# Patient Record
Sex: MALE | Race: OTHER | Employment: OTHER | URBAN - METROPOLITAN AREA
[De-identification: names, ages, dates, MRNs, and addresses within clinical notes are randomized per-mention and may not be internally consistent; named-entity substitution may affect disease eponyms.]

---

## 2022-01-01 ENCOUNTER — APPOINTMENT (OUTPATIENT)
Dept: GENERAL RADIOLOGY | Facility: HOSPITAL | Age: 87
DRG: 177 | End: 2022-01-01
Attending: EMERGENCY MEDICINE
Payer: MEDICARE

## 2022-01-01 ENCOUNTER — HOSPITAL ENCOUNTER (INPATIENT)
Facility: HOSPITAL | Age: 87
LOS: 14 days | Discharge: HOSPICE | DRG: 177 | End: 2022-01-01
Attending: EMERGENCY MEDICINE | Admitting: INTERNAL MEDICINE
Payer: MEDICARE

## 2022-01-01 ENCOUNTER — APPOINTMENT (OUTPATIENT)
Dept: CT IMAGING | Facility: HOSPITAL | Age: 87
DRG: 177 | End: 2022-01-01
Attending: INTERNAL MEDICINE
Payer: MEDICARE

## 2022-01-01 ENCOUNTER — HOSPITAL ENCOUNTER (INPATIENT)
Facility: HOSPITAL | Age: 87
LOS: 2 days | DRG: 177 | End: 2022-01-01
Attending: INTERNAL MEDICINE | Admitting: INTERNAL MEDICINE
Payer: OTHER MISCELLANEOUS

## 2022-01-01 VITALS
TEMPERATURE: 98 F | WEIGHT: 156.25 LBS | OXYGEN SATURATION: 100 % | HEIGHT: 69 IN | SYSTOLIC BLOOD PRESSURE: 131 MMHG | HEART RATE: 72 BPM | DIASTOLIC BLOOD PRESSURE: 62 MMHG | BODY MASS INDEX: 23.14 KG/M2 | RESPIRATION RATE: 16 BRPM

## 2022-01-01 VITALS — TEMPERATURE: 100 F

## 2022-01-01 DIAGNOSIS — J18.9 PNEUMONIA OF RIGHT MIDDLE LOBE DUE TO INFECTIOUS ORGANISM: Primary | ICD-10-CM

## 2022-01-01 LAB
ADENOVIRUS PCR:: NOT DETECTED
ANION GAP SERPL CALC-SCNC: 3 MMOL/L (ref 0–18)
ANION GAP SERPL CALC-SCNC: 4 MMOL/L (ref 0–18)
ANION GAP SERPL CALC-SCNC: 5 MMOL/L (ref 0–18)
ANION GAP SERPL CALC-SCNC: 6 MMOL/L (ref 0–18)
ANION GAP SERPL CALC-SCNC: 7 MMOL/L (ref 0–18)
ANION GAP SERPL CALC-SCNC: 8 MMOL/L (ref 0–18)
ANION GAP SERPL CALC-SCNC: 9 MMOL/L (ref 0–18)
B PARAPERT DNA SPEC QL NAA+PROBE: NOT DETECTED
B PERT DNA SPEC QL NAA+PROBE: NOT DETECTED
BASE EXCESS BLD CALC-SCNC: 9.1 MMOL/L (ref ?–2)
BASOPHILS # BLD AUTO: 0.02 X10(3) UL (ref 0–0.2)
BASOPHILS # BLD AUTO: 0.04 X10(3) UL (ref 0–0.2)
BASOPHILS # BLD AUTO: 0.05 X10(3) UL (ref 0–0.2)
BASOPHILS # BLD AUTO: 0.06 X10(3) UL (ref 0–0.2)
BASOPHILS # BLD AUTO: 0.07 X10(3) UL (ref 0–0.2)
BASOPHILS # BLD AUTO: 0.08 X10(3) UL (ref 0–0.2)
BASOPHILS NFR BLD AUTO: 0.2 %
BASOPHILS NFR BLD AUTO: 0.4 %
BASOPHILS NFR BLD AUTO: 0.4 %
BASOPHILS NFR BLD AUTO: 0.5 %
BASOPHILS NFR BLD AUTO: 0.6 %
BASOPHILS NFR BLD AUTO: 0.7 %
BILIRUB UR QL: NEGATIVE
BUN BLD-MCNC: 12 MG/DL (ref 7–18)
BUN BLD-MCNC: 13 MG/DL (ref 7–18)
BUN BLD-MCNC: 15 MG/DL (ref 7–18)
BUN BLD-MCNC: 20 MG/DL (ref 7–18)
BUN BLD-MCNC: 41 MG/DL (ref 7–18)
BUN BLD-MCNC: 7 MG/DL (ref 7–18)
BUN BLD-MCNC: 7 MG/DL (ref 7–18)
BUN BLD-MCNC: 9 MG/DL (ref 7–18)
BUN/CREAT SERPL: 10.6 (ref 10–20)
BUN/CREAT SERPL: 11.1 (ref 10–20)
BUN/CREAT SERPL: 12.2 (ref 10–20)
BUN/CREAT SERPL: 14.8 (ref 10–20)
BUN/CREAT SERPL: 15.1 (ref 10–20)
BUN/CREAT SERPL: 16.5 (ref 10–20)
BUN/CREAT SERPL: 17.6 (ref 10–20)
BUN/CREAT SERPL: 19.5 (ref 10–20)
BUN/CREAT SERPL: 22.5 (ref 10–20)
BUN/CREAT SERPL: 49.4 (ref 10–20)
C PNEUM DNA SPEC QL NAA+PROBE: NOT DETECTED
CALCIUM BLD-MCNC: 8.7 MG/DL (ref 8.5–10.1)
CALCIUM BLD-MCNC: 8.8 MG/DL (ref 8.5–10.1)
CALCIUM BLD-MCNC: 8.8 MG/DL (ref 8.5–10.1)
CALCIUM BLD-MCNC: 8.9 MG/DL (ref 8.5–10.1)
CALCIUM BLD-MCNC: 9 MG/DL (ref 8.5–10.1)
CALCIUM BLD-MCNC: 9 MG/DL (ref 8.5–10.1)
CALCIUM BLD-MCNC: 9.2 MG/DL (ref 8.5–10.1)
CALCIUM BLD-MCNC: 9.3 MG/DL (ref 8.5–10.1)
CALCIUM BLD-MCNC: 9.3 MG/DL (ref 8.5–10.1)
CALCIUM BLD-MCNC: 9.7 MG/DL (ref 8.5–10.1)
CHLORIDE SERPL-SCNC: 101 MMOL/L (ref 98–112)
CHLORIDE SERPL-SCNC: 103 MMOL/L (ref 98–112)
CHLORIDE SERPL-SCNC: 104 MMOL/L (ref 98–112)
CHLORIDE SERPL-SCNC: 104 MMOL/L (ref 98–112)
CHLORIDE SERPL-SCNC: 105 MMOL/L (ref 98–112)
CHLORIDE SERPL-SCNC: 105 MMOL/L (ref 98–112)
CHLORIDE SERPL-SCNC: 106 MMOL/L (ref 98–112)
CHLORIDE SERPL-SCNC: 106 MMOL/L (ref 98–112)
CHLORIDE SERPL-SCNC: 107 MMOL/L (ref 98–112)
CHLORIDE SERPL-SCNC: 108 MMOL/L (ref 98–112)
CO2 SERPL-SCNC: 24 MMOL/L (ref 21–32)
CO2 SERPL-SCNC: 26 MMOL/L (ref 21–32)
CO2 SERPL-SCNC: 28 MMOL/L (ref 21–32)
CO2 SERPL-SCNC: 29 MMOL/L (ref 21–32)
CO2 SERPL-SCNC: 29 MMOL/L (ref 21–32)
CO2 SERPL-SCNC: 30 MMOL/L (ref 21–32)
CO2 SERPL-SCNC: 31 MMOL/L (ref 21–32)
CO2 SERPL-SCNC: 32 MMOL/L (ref 21–32)
COLOR UR: YELLOW
CORONAVIRUS 229E PCR:: NOT DETECTED
CORONAVIRUS HKU1 PCR:: NOT DETECTED
CORONAVIRUS NL63 PCR:: NOT DETECTED
CORONAVIRUS OC43 PCR:: NOT DETECTED
CREAT BLD-MCNC: 0.63 MG/DL
CREAT BLD-MCNC: 0.66 MG/DL
CREAT BLD-MCNC: 0.74 MG/DL
CREAT BLD-MCNC: 0.74 MG/DL
CREAT BLD-MCNC: 0.77 MG/DL
CREAT BLD-MCNC: 0.79 MG/DL
CREAT BLD-MCNC: 0.81 MG/DL
CREAT BLD-MCNC: 0.83 MG/DL
CREAT BLD-MCNC: 0.86 MG/DL
CREAT BLD-MCNC: 0.89 MG/DL
DEPRECATED RDW RBC AUTO: 43.1 FL (ref 35.1–46.3)
DEPRECATED RDW RBC AUTO: 43.3 FL (ref 35.1–46.3)
DEPRECATED RDW RBC AUTO: 43.4 FL (ref 35.1–46.3)
DEPRECATED RDW RBC AUTO: 43.8 FL (ref 35.1–46.3)
DEPRECATED RDW RBC AUTO: 44.4 FL (ref 35.1–46.3)
DEPRECATED RDW RBC AUTO: 45.6 FL (ref 35.1–46.3)
DEPRECATED RDW RBC AUTO: 45.6 FL (ref 35.1–46.3)
DEPRECATED RDW RBC AUTO: 45.7 FL (ref 35.1–46.3)
DEPRECATED RDW RBC AUTO: 46.2 FL (ref 35.1–46.3)
DEPRECATED RDW RBC AUTO: 46.5 FL (ref 35.1–46.3)
DEPRECATED RDW RBC AUTO: 46.8 FL (ref 35.1–46.3)
EOSINOPHIL # BLD AUTO: 0.01 X10(3) UL (ref 0–0.7)
EOSINOPHIL # BLD AUTO: 0.18 X10(3) UL (ref 0–0.7)
EOSINOPHIL # BLD AUTO: 0.42 X10(3) UL (ref 0–0.7)
EOSINOPHIL # BLD AUTO: 0.6 X10(3) UL (ref 0–0.7)
EOSINOPHIL # BLD AUTO: 0.6 X10(3) UL (ref 0–0.7)
EOSINOPHIL # BLD AUTO: 0.62 X10(3) UL (ref 0–0.7)
EOSINOPHIL # BLD AUTO: 0.7 X10(3) UL (ref 0–0.7)
EOSINOPHIL # BLD AUTO: 0.72 X10(3) UL (ref 0–0.7)
EOSINOPHIL # BLD AUTO: 0.73 X10(3) UL (ref 0–0.7)
EOSINOPHIL # BLD AUTO: 0.81 X10(3) UL (ref 0–0.7)
EOSINOPHIL # BLD AUTO: 0.86 X10(3) UL (ref 0–0.7)
EOSINOPHIL NFR BLD AUTO: 0.1 %
EOSINOPHIL NFR BLD AUTO: 1.5 %
EOSINOPHIL NFR BLD AUTO: 3.5 %
EOSINOPHIL NFR BLD AUTO: 6 %
EOSINOPHIL NFR BLD AUTO: 6 %
EOSINOPHIL NFR BLD AUTO: 6.2 %
EOSINOPHIL NFR BLD AUTO: 6.2 %
EOSINOPHIL NFR BLD AUTO: 6.3 %
EOSINOPHIL NFR BLD AUTO: 6.6 %
EOSINOPHIL NFR BLD AUTO: 6.7 %
EOSINOPHIL NFR BLD AUTO: 7.4 %
ERYTHROCYTE [DISTWIDTH] IN BLOOD BY AUTOMATED COUNT: 12.3 % (ref 11–15)
ERYTHROCYTE [DISTWIDTH] IN BLOOD BY AUTOMATED COUNT: 12.4 % (ref 11–15)
ERYTHROCYTE [DISTWIDTH] IN BLOOD BY AUTOMATED COUNT: 12.4 % (ref 11–15)
ERYTHROCYTE [DISTWIDTH] IN BLOOD BY AUTOMATED COUNT: 12.5 % (ref 11–15)
ERYTHROCYTE [DISTWIDTH] IN BLOOD BY AUTOMATED COUNT: 12.7 % (ref 11–15)
ERYTHROCYTE [DISTWIDTH] IN BLOOD BY AUTOMATED COUNT: 12.7 % (ref 11–15)
ERYTHROCYTE [DISTWIDTH] IN BLOOD BY AUTOMATED COUNT: 12.9 % (ref 11–15)
ERYTHROCYTE [DISTWIDTH] IN BLOOD BY AUTOMATED COUNT: 12.9 % (ref 11–15)
ERYTHROCYTE [DISTWIDTH] IN BLOOD BY AUTOMATED COUNT: 13 % (ref 11–15)
ERYTHROCYTE [DISTWIDTH] IN BLOOD BY AUTOMATED COUNT: 13.2 % (ref 11–15)
ERYTHROCYTE [DISTWIDTH] IN BLOOD BY AUTOMATED COUNT: 13.3 % (ref 11–15)
FLUAV RNA SPEC QL NAA+PROBE: NOT DETECTED
FLUBV RNA SPEC QL NAA+PROBE: NOT DETECTED
GLUCOSE BLD-MCNC: 100 MG/DL (ref 70–99)
GLUCOSE BLD-MCNC: 102 MG/DL (ref 70–99)
GLUCOSE BLD-MCNC: 103 MG/DL (ref 70–99)
GLUCOSE BLD-MCNC: 106 MG/DL (ref 70–99)
GLUCOSE BLD-MCNC: 111 MG/DL (ref 70–99)
GLUCOSE BLD-MCNC: 111 MG/DL (ref 70–99)
GLUCOSE BLD-MCNC: 113 MG/DL (ref 70–99)
GLUCOSE BLD-MCNC: 114 MG/DL (ref 70–99)
GLUCOSE BLD-MCNC: 85 MG/DL (ref 70–99)
GLUCOSE BLD-MCNC: 91 MG/DL (ref 70–99)
GLUCOSE BLDC GLUCOMTR-MCNC: 101 MG/DL (ref 70–99)
GLUCOSE UR-MCNC: NEGATIVE MG/DL
HCO3 BLDA-SCNC: 31.9 MEQ/L (ref 21–27)
HCT VFR BLD AUTO: 37.3 %
HCT VFR BLD AUTO: 38.9 %
HCT VFR BLD AUTO: 39.2 %
HCT VFR BLD AUTO: 39.4 %
HCT VFR BLD AUTO: 39.7 %
HCT VFR BLD AUTO: 40.6 %
HCT VFR BLD AUTO: 40.7 %
HCT VFR BLD AUTO: 40.9 %
HCT VFR BLD AUTO: 41.5 %
HCT VFR BLD AUTO: 42.3 %
HCT VFR BLD AUTO: 45.4 %
HGB BLD-MCNC: 12.6 G/DL
HGB BLD-MCNC: 12.8 G/DL
HGB BLD-MCNC: 12.9 G/DL
HGB BLD-MCNC: 13.1 G/DL
HGB BLD-MCNC: 13.4 G/DL
HGB BLD-MCNC: 13.7 G/DL
HGB BLD-MCNC: 13.8 G/DL
HGB BLD-MCNC: 13.8 G/DL
HGB BLD-MCNC: 14.6 G/DL
HGB UR QL STRIP.AUTO: NEGATIVE
IMM GRANULOCYTES # BLD AUTO: 0.05 X10(3) UL (ref 0–1)
IMM GRANULOCYTES # BLD AUTO: 0.05 X10(3) UL (ref 0–1)
IMM GRANULOCYTES # BLD AUTO: 0.06 X10(3) UL (ref 0–1)
IMM GRANULOCYTES # BLD AUTO: 0.06 X10(3) UL (ref 0–1)
IMM GRANULOCYTES # BLD AUTO: 0.07 X10(3) UL (ref 0–1)
IMM GRANULOCYTES # BLD AUTO: 0.08 X10(3) UL (ref 0–1)
IMM GRANULOCYTES # BLD AUTO: 0.09 X10(3) UL (ref 0–1)
IMM GRANULOCYTES # BLD AUTO: 0.1 X10(3) UL (ref 0–1)
IMM GRANULOCYTES NFR BLD: 0.5 %
IMM GRANULOCYTES NFR BLD: 0.5 %
IMM GRANULOCYTES NFR BLD: 0.6 %
IMM GRANULOCYTES NFR BLD: 0.7 %
IMM GRANULOCYTES NFR BLD: 0.8 %
KETONES UR-MCNC: 80 MG/DL
LEUKOCYTE ESTERASE UR QL STRIP.AUTO: NEGATIVE
LYMPHOCYTES # BLD AUTO: 0.65 X10(3) UL (ref 1–4)
LYMPHOCYTES # BLD AUTO: 1.01 X10(3) UL (ref 1–4)
LYMPHOCYTES # BLD AUTO: 1.54 X10(3) UL (ref 1–4)
LYMPHOCYTES # BLD AUTO: 1.55 X10(3) UL (ref 1–4)
LYMPHOCYTES # BLD AUTO: 1.57 X10(3) UL (ref 1–4)
LYMPHOCYTES # BLD AUTO: 1.61 X10(3) UL (ref 1–4)
LYMPHOCYTES # BLD AUTO: 1.65 X10(3) UL (ref 1–4)
LYMPHOCYTES # BLD AUTO: 1.65 X10(3) UL (ref 1–4)
LYMPHOCYTES # BLD AUTO: 1.7 X10(3) UL (ref 1–4)
LYMPHOCYTES # BLD AUTO: 1.9 X10(3) UL (ref 1–4)
LYMPHOCYTES # BLD AUTO: 1.95 X10(3) UL (ref 1–4)
LYMPHOCYTES NFR BLD AUTO: 13.2 %
LYMPHOCYTES NFR BLD AUTO: 13.8 %
LYMPHOCYTES NFR BLD AUTO: 14.2 %
LYMPHOCYTES NFR BLD AUTO: 14.7 %
LYMPHOCYTES NFR BLD AUTO: 14.9 %
LYMPHOCYTES NFR BLD AUTO: 15.9 %
LYMPHOCYTES NFR BLD AUTO: 16.5 %
LYMPHOCYTES NFR BLD AUTO: 16.5 %
LYMPHOCYTES NFR BLD AUTO: 17.9 %
LYMPHOCYTES NFR BLD AUTO: 5.6 %
LYMPHOCYTES NFR BLD AUTO: 8.2 %
MCH RBC QN AUTO: 31.3 PG (ref 26–34)
MCH RBC QN AUTO: 31.4 PG (ref 26–34)
MCH RBC QN AUTO: 31.5 PG (ref 26–34)
MCH RBC QN AUTO: 31.5 PG (ref 26–34)
MCH RBC QN AUTO: 31.8 PG (ref 26–34)
MCH RBC QN AUTO: 31.9 PG (ref 26–34)
MCH RBC QN AUTO: 32.1 PG (ref 26–34)
MCH RBC QN AUTO: 32.2 PG (ref 26–34)
MCH RBC QN AUTO: 32.2 PG (ref 26–34)
MCH RBC QN AUTO: 32.3 PG (ref 26–34)
MCH RBC QN AUTO: 32.4 PG (ref 26–34)
MCHC RBC AUTO-ENTMCNC: 32.2 G/DL (ref 31–37)
MCHC RBC AUTO-ENTMCNC: 32.3 G/DL (ref 31–37)
MCHC RBC AUTO-ENTMCNC: 32.6 G/DL (ref 31–37)
MCHC RBC AUTO-ENTMCNC: 32.7 G/DL (ref 31–37)
MCHC RBC AUTO-ENTMCNC: 32.9 G/DL (ref 31–37)
MCHC RBC AUTO-ENTMCNC: 32.9 G/DL (ref 31–37)
MCHC RBC AUTO-ENTMCNC: 33.4 G/DL (ref 31–37)
MCHC RBC AUTO-ENTMCNC: 33.7 G/DL (ref 31–37)
MCHC RBC AUTO-ENTMCNC: 33.7 G/DL (ref 31–37)
MCHC RBC AUTO-ENTMCNC: 33.8 G/DL (ref 31–37)
MCHC RBC AUTO-ENTMCNC: 33.8 G/DL (ref 31–37)
MCV RBC AUTO: 95.2 FL
MCV RBC AUTO: 95.3 FL
MCV RBC AUTO: 95.5 FL
MCV RBC AUTO: 95.6 FL
MCV RBC AUTO: 96.8 FL
MCV RBC AUTO: 97 FL
MCV RBC AUTO: 97.4 FL
MCV RBC AUTO: 97.6 FL
MCV RBC AUTO: 97.7 FL
METAPNEUMOVIRUS PCR:: NOT DETECTED
MODIFIED ALLEN TEST: POSITIVE
MONOCYTES # BLD AUTO: 0.68 X10(3) UL (ref 0.1–1)
MONOCYTES # BLD AUTO: 0.8 X10(3) UL (ref 0.1–1)
MONOCYTES # BLD AUTO: 0.92 X10(3) UL (ref 0.1–1)
MONOCYTES # BLD AUTO: 0.94 X10(3) UL (ref 0.1–1)
MONOCYTES # BLD AUTO: 0.94 X10(3) UL (ref 0.1–1)
MONOCYTES # BLD AUTO: 0.95 X10(3) UL (ref 0.1–1)
MONOCYTES # BLD AUTO: 1.17 X10(3) UL (ref 0.1–1)
MONOCYTES # BLD AUTO: 1.18 X10(3) UL (ref 0.1–1)
MONOCYTES # BLD AUTO: 1.21 X10(3) UL (ref 0.1–1)
MONOCYTES # BLD AUTO: 1.25 X10(3) UL (ref 0.1–1)
MONOCYTES # BLD AUTO: 1.46 X10(3) UL (ref 0.1–1)
MONOCYTES NFR BLD AUTO: 10.2 %
MONOCYTES NFR BLD AUTO: 10.6 %
MONOCYTES NFR BLD AUTO: 10.7 %
MONOCYTES NFR BLD AUTO: 12.2 %
MONOCYTES NFR BLD AUTO: 5.9 %
MONOCYTES NFR BLD AUTO: 7.4 %
MONOCYTES NFR BLD AUTO: 8 %
MONOCYTES NFR BLD AUTO: 8.9 %
MONOCYTES NFR BLD AUTO: 9.2 %
MONOCYTES NFR BLD AUTO: 9.4 %
MONOCYTES NFR BLD AUTO: 9.8 %
MRSA DNA SPEC QL NAA+PROBE: NEGATIVE
MYCOPLASMA PNEUMONIA PCR:: NOT DETECTED
NEUTROPHILS # BLD AUTO: 10.07 X10 (3) UL (ref 1.5–7.7)
NEUTROPHILS # BLD AUTO: 10.07 X10(3) UL (ref 1.5–7.7)
NEUTROPHILS # BLD AUTO: 10.11 X10 (3) UL (ref 1.5–7.7)
NEUTROPHILS # BLD AUTO: 10.11 X10(3) UL (ref 1.5–7.7)
NEUTROPHILS # BLD AUTO: 6.48 X10 (3) UL (ref 1.5–7.7)
NEUTROPHILS # BLD AUTO: 6.48 X10(3) UL (ref 1.5–7.7)
NEUTROPHILS # BLD AUTO: 6.68 X10 (3) UL (ref 1.5–7.7)
NEUTROPHILS # BLD AUTO: 6.68 X10(3) UL (ref 1.5–7.7)
NEUTROPHILS # BLD AUTO: 6.85 X10 (3) UL (ref 1.5–7.7)
NEUTROPHILS # BLD AUTO: 6.85 X10 (3) UL (ref 1.5–7.7)
NEUTROPHILS # BLD AUTO: 6.85 X10(3) UL (ref 1.5–7.7)
NEUTROPHILS # BLD AUTO: 6.85 X10(3) UL (ref 1.5–7.7)
NEUTROPHILS # BLD AUTO: 7.29 X10 (3) UL (ref 1.5–7.7)
NEUTROPHILS # BLD AUTO: 7.29 X10(3) UL (ref 1.5–7.7)
NEUTROPHILS # BLD AUTO: 7.74 X10 (3) UL (ref 1.5–7.7)
NEUTROPHILS # BLD AUTO: 7.74 X10(3) UL (ref 1.5–7.7)
NEUTROPHILS # BLD AUTO: 8.28 X10 (3) UL (ref 1.5–7.7)
NEUTROPHILS # BLD AUTO: 8.28 X10(3) UL (ref 1.5–7.7)
NEUTROPHILS # BLD AUTO: 8.48 X10 (3) UL (ref 1.5–7.7)
NEUTROPHILS # BLD AUTO: 8.48 X10(3) UL (ref 1.5–7.7)
NEUTROPHILS # BLD AUTO: 8.67 X10 (3) UL (ref 1.5–7.7)
NEUTROPHILS # BLD AUTO: 8.67 X10(3) UL (ref 1.5–7.7)
NEUTROPHILS NFR BLD AUTO: 62.8 %
NEUTROPHILS NFR BLD AUTO: 66.9 %
NEUTROPHILS NFR BLD AUTO: 67.1 %
NEUTROPHILS NFR BLD AUTO: 67.9 %
NEUTROPHILS NFR BLD AUTO: 68.2 %
NEUTROPHILS NFR BLD AUTO: 68.3 %
NEUTROPHILS NFR BLD AUTO: 68.8 %
NEUTROPHILS NFR BLD AUTO: 68.9 %
NEUTROPHILS NFR BLD AUTO: 69.2 %
NEUTROPHILS NFR BLD AUTO: 81.9 %
NEUTROPHILS NFR BLD AUTO: 87.5 %
NITRITE UR QL STRIP.AUTO: NEGATIVE
NT-PROBNP SERPL-MCNC: 205 PG/ML (ref ?–450)
O2 CT BLD-SCNC: 16.6 VOL% (ref 15–23)
OSMOLALITY SERPL CALC.SUM OF ELEC: 283 MOSM/KG (ref 275–295)
OSMOLALITY SERPL CALC.SUM OF ELEC: 286 MOSM/KG (ref 275–295)
OSMOLALITY SERPL CALC.SUM OF ELEC: 287 MOSM/KG (ref 275–295)
OSMOLALITY SERPL CALC.SUM OF ELEC: 287 MOSM/KG (ref 275–295)
OSMOLALITY SERPL CALC.SUM OF ELEC: 288 MOSM/KG (ref 275–295)
OSMOLALITY SERPL CALC.SUM OF ELEC: 289 MOSM/KG (ref 275–295)
OSMOLALITY SERPL CALC.SUM OF ELEC: 291 MOSM/KG (ref 275–295)
OSMOLALITY SERPL CALC.SUM OF ELEC: 292 MOSM/KG (ref 275–295)
OSMOLALITY SERPL CALC.SUM OF ELEC: 294 MOSM/KG (ref 275–295)
OSMOLALITY SERPL CALC.SUM OF ELEC: 305 MOSM/KG (ref 275–295)
PARAINFLUENZA 1 PCR:: NOT DETECTED
PARAINFLUENZA 2 PCR:: NOT DETECTED
PARAINFLUENZA 3 PCR:: NOT DETECTED
PARAINFLUENZA 4 PCR:: NOT DETECTED
PCO2 BLDA: 44 MM HG (ref 35–45)
PH BLDA: 7.49 [PH] (ref 7.35–7.45)
PH UR: 5 [PH] (ref 5–8)
PLATELET # BLD AUTO: 206 10(3)UL (ref 150–450)
PLATELET # BLD AUTO: 221 10(3)UL (ref 150–450)
PLATELET # BLD AUTO: 222 10(3)UL (ref 150–450)
PLATELET # BLD AUTO: 231 10(3)UL (ref 150–450)
PLATELET # BLD AUTO: 232 10(3)UL (ref 150–450)
PLATELET # BLD AUTO: 234 10(3)UL (ref 150–450)
PLATELET # BLD AUTO: 241 10(3)UL (ref 150–450)
PLATELET # BLD AUTO: 264 10(3)UL (ref 150–450)
PLATELET # BLD AUTO: 265 10(3)UL (ref 150–450)
PLATELET # BLD AUTO: 277 10(3)UL (ref 150–450)
PLATELET # BLD AUTO: 293 10(3)UL (ref 150–450)
PO2 BLDA: 61 MM HG (ref 80–100)
POTASSIUM SERPL-SCNC: 3.3 MMOL/L (ref 3.5–5.1)
POTASSIUM SERPL-SCNC: 3.4 MMOL/L (ref 3.5–5.1)
POTASSIUM SERPL-SCNC: 3.4 MMOL/L (ref 3.5–5.1)
POTASSIUM SERPL-SCNC: 3.5 MMOL/L (ref 3.5–5.1)
POTASSIUM SERPL-SCNC: 3.6 MMOL/L (ref 3.5–5.1)
POTASSIUM SERPL-SCNC: 3.7 MMOL/L (ref 3.5–5.1)
POTASSIUM SERPL-SCNC: 3.9 MMOL/L (ref 3.5–5.1)
POTASSIUM SERPL-SCNC: 3.9 MMOL/L (ref 3.5–5.1)
POTASSIUM SERPL-SCNC: 4.4 MMOL/L (ref 3.5–5.1)
PROCALCITONIN SERPL-MCNC: 0.03 NG/ML (ref ?–0.16)
PROT UR-MCNC: 100 MG/DL
PUNCTURE CHARGE: YES
RBC # BLD AUTO: 3.9 X10(6)UL
RBC # BLD AUTO: 4.02 X10(6)UL
RBC # BLD AUTO: 4.04 X10(6)UL
RBC # BLD AUTO: 4.12 X10(6)UL
RBC # BLD AUTO: 4.17 X10(6)UL
RBC # BLD AUTO: 4.26 X10(6)UL
RBC # BLD AUTO: 4.28 X10(6)UL
RBC # BLD AUTO: 4.33 X10(6)UL
RBC # BLD AUTO: 4.65 X10(6)UL
RHINOVIRUS/ENTERO PCR:: NOT DETECTED
RSV RNA SPEC QL NAA+PROBE: NOT DETECTED
SAO2 % BLDA: 96.7 % (ref 94–100)
SARS-COV-2 RNA NPH QL NAA+NON-PROBE: DETECTED
SARS-COV-2 RNA RESP QL NAA+PROBE: NOT DETECTED
SODIUM SERPL-SCNC: 137 MMOL/L (ref 136–145)
SODIUM SERPL-SCNC: 138 MMOL/L (ref 136–145)
SODIUM SERPL-SCNC: 139 MMOL/L (ref 136–145)
SODIUM SERPL-SCNC: 140 MMOL/L (ref 136–145)
SODIUM SERPL-SCNC: 140 MMOL/L (ref 136–145)
SODIUM SERPL-SCNC: 142 MMOL/L (ref 136–145)
SODIUM SERPL-SCNC: 142 MMOL/L (ref 136–145)
SP GR UR STRIP: >1.03 (ref 1–1.03)
UROBILINOGEN UR STRIP-ACNC: 4
WBC # BLD AUTO: 10 X10(3) UL (ref 4–11)
WBC # BLD AUTO: 10 X10(3) UL (ref 4–11)
WBC # BLD AUTO: 10.6 X10(3) UL (ref 4–11)
WBC # BLD AUTO: 10.9 X10(3) UL (ref 4–11)
WBC # BLD AUTO: 11.4 X10(3) UL (ref 4–11)
WBC # BLD AUTO: 11.5 X10(3) UL (ref 4–11)
WBC # BLD AUTO: 12 X10(3) UL (ref 4–11)
WBC # BLD AUTO: 12.2 X10(3) UL (ref 4–11)
WBC # BLD AUTO: 12.4 X10(3) UL (ref 4–11)
WBC # BLD AUTO: 12.8 X10(3) UL (ref 4–11)
WBC # BLD AUTO: 9.7 X10(3) UL (ref 4–11)

## 2022-01-01 PROCEDURE — 70450 CT HEAD/BRAIN W/O DYE: CPT | Performed by: INTERNAL MEDICINE

## 2022-01-01 PROCEDURE — 95816 EEG AWAKE AND DROWSY: CPT | Performed by: OTHER

## 2022-01-01 PROCEDURE — 71045 X-RAY EXAM CHEST 1 VIEW: CPT | Performed by: EMERGENCY MEDICINE

## 2022-01-01 PROCEDURE — 99221 1ST HOSP IP/OBS SF/LOW 40: CPT | Performed by: REGISTERED NURSE

## 2022-01-01 RX ORDER — DEXTROSE AND SODIUM CHLORIDE 5; .45 G/100ML; G/100ML
INJECTION, SOLUTION INTRAVENOUS CONTINUOUS
Status: CANCELLED | OUTPATIENT
Start: 2022-01-01

## 2022-01-01 RX ORDER — DONEPEZIL HYDROCHLORIDE 10 MG/1
10 TABLET, FILM COATED ORAL NIGHTLY
COMMUNITY

## 2022-01-01 RX ORDER — RISPERIDONE 1 MG/1
1 TABLET, FILM COATED ORAL 2 TIMES DAILY
COMMUNITY

## 2022-01-01 RX ORDER — BISACODYL 10 MG
10 SUPPOSITORY, RECTAL RECTAL
Status: DISCONTINUED | OUTPATIENT
Start: 2022-01-01 | End: 2022-01-01

## 2022-01-01 RX ORDER — LORAZEPAM 2 MG/ML
0.5 INJECTION INTRAMUSCULAR EVERY 4 HOURS PRN
Status: DISCONTINUED | OUTPATIENT
Start: 2022-01-01 | End: 2022-01-01

## 2022-01-01 RX ORDER — MORPHINE SULFATE IN 0.9 % NACL 1 MG/ML
1 PLASTIC BAG, INJECTION (ML) INTRAVENOUS CONTINUOUS PRN
Status: DISCONTINUED | OUTPATIENT
Start: 2022-01-01 | End: 2022-01-01

## 2022-01-01 RX ORDER — DEXTROSE AND SODIUM CHLORIDE 5; .45 G/100ML; G/100ML
INJECTION, SOLUTION INTRAVENOUS CONTINUOUS
Status: DISCONTINUED | OUTPATIENT
Start: 2022-01-01 | End: 2022-01-01

## 2022-01-01 RX ORDER — MULTIPLE VITAMINS W/ MINERALS TAB 9MG-400MCG
1 TAB ORAL DAILY
Status: DISCONTINUED | OUTPATIENT
Start: 2022-01-01 | End: 2022-01-01

## 2022-01-01 RX ORDER — CHOLECALCIFEROL (VITAMIN D3) 125 MCG
5 CAPSULE ORAL NIGHTLY
COMMUNITY

## 2022-01-01 RX ORDER — HEPARIN SODIUM 5000 [USP'U]/ML
5000 INJECTION, SOLUTION INTRAVENOUS; SUBCUTANEOUS EVERY 12 HOURS SCHEDULED
Status: DISCONTINUED | OUTPATIENT
Start: 2022-01-01 | End: 2022-01-01

## 2022-01-01 RX ORDER — RISPERIDONE 0.5 MG/1
0.5 TABLET, FILM COATED ORAL NIGHTLY
COMMUNITY

## 2022-01-01 RX ORDER — ALBUTEROL SULFATE 90 UG/1
2 AEROSOL, METERED RESPIRATORY (INHALATION) 4 TIMES DAILY
Status: DISCONTINUED | OUTPATIENT
Start: 2022-01-01 | End: 2022-01-01

## 2022-01-01 RX ORDER — ACETAMINOPHEN 650 MG/1
650 SUPPOSITORY RECTAL EVERY 4 HOURS PRN
Status: DISCONTINUED | OUTPATIENT
Start: 2022-01-01 | End: 2022-01-01

## 2022-01-01 RX ORDER — LORAZEPAM 2 MG/ML
2 INJECTION INTRAMUSCULAR EVERY 4 HOURS PRN
Status: DISCONTINUED | OUTPATIENT
Start: 2022-01-01 | End: 2022-01-01

## 2022-01-01 RX ORDER — LATANOPROST 50 UG/ML
1 SOLUTION/ DROPS OPHTHALMIC NIGHTLY
Status: CANCELLED | OUTPATIENT
Start: 2022-01-01

## 2022-01-01 RX ORDER — HALOPERIDOL 5 MG/ML
1 INJECTION INTRAMUSCULAR
Status: DISCONTINUED | OUTPATIENT
Start: 2022-01-01 | End: 2022-01-01

## 2022-01-01 RX ORDER — GUAIFENESIN AND DEXTROMETHORPHAN HYDROBROMIDE 100; 10 MG/5ML; MG/5ML
5 SOLUTION ORAL AS NEEDED
COMMUNITY

## 2022-01-01 RX ORDER — ONDANSETRON 2 MG/ML
4 INJECTION INTRAMUSCULAR; INTRAVENOUS EVERY 6 HOURS PRN
Status: DISCONTINUED | OUTPATIENT
Start: 2022-01-01 | End: 2022-01-01

## 2022-01-01 RX ORDER — FUROSEMIDE 10 MG/ML
40 INJECTION INTRAMUSCULAR; INTRAVENOUS EVERY 8 HOURS PRN
Status: DISCONTINUED | OUTPATIENT
Start: 2022-01-01 | End: 2022-01-01

## 2022-01-01 RX ORDER — ATROPINE SULFATE 10 MG/ML
2 SOLUTION/ DROPS OPHTHALMIC EVERY 2 HOUR PRN
Status: DISCONTINUED | OUTPATIENT
Start: 2022-01-01 | End: 2022-01-01

## 2022-01-01 RX ORDER — SCOLOPAMINE TRANSDERMAL SYSTEM 1 MG/1
1 PATCH, EXTENDED RELEASE TRANSDERMAL
Status: DISCONTINUED | OUTPATIENT
Start: 2022-01-01 | End: 2022-01-01

## 2022-01-01 RX ORDER — RISPERIDONE 0.5 MG/1
1 TABLET, FILM COATED ORAL
Status: DISCONTINUED | OUTPATIENT
Start: 2022-01-01 | End: 2022-01-01

## 2022-01-01 RX ORDER — GLYCOPYRROLATE 0.2 MG/ML
0.4 INJECTION, SOLUTION INTRAMUSCULAR; INTRAVENOUS
Status: DISCONTINUED | OUTPATIENT
Start: 2022-01-01 | End: 2022-01-01

## 2022-01-01 RX ORDER — HALOPERIDOL 5 MG/ML
2 INJECTION INTRAMUSCULAR
Status: DISCONTINUED | OUTPATIENT
Start: 2022-01-01 | End: 2022-01-01

## 2022-01-01 RX ORDER — CHOLECALCIFEROL (VITAMIN D3) 125 MCG
500 CAPSULE ORAL DAILY
Status: DISCONTINUED | OUTPATIENT
Start: 2022-01-01 | End: 2022-01-01

## 2022-01-01 RX ORDER — LORAZEPAM 2 MG/ML
1 INJECTION INTRAMUSCULAR EVERY 4 HOURS PRN
Status: DISCONTINUED | OUTPATIENT
Start: 2022-01-01 | End: 2022-01-01

## 2022-01-01 RX ORDER — MORPHINE SULFATE 2 MG/ML
1 INJECTION, SOLUTION INTRAMUSCULAR; INTRAVENOUS
Status: DISCONTINUED | OUTPATIENT
Start: 2022-01-01 | End: 2022-01-01

## 2022-01-01 RX ORDER — DONEPEZIL HYDROCHLORIDE 10 MG/1
10 TABLET, FILM COATED ORAL NIGHTLY
Status: DISCONTINUED | OUTPATIENT
Start: 2022-01-01 | End: 2022-01-01

## 2022-01-01 RX ORDER — IPRATROPIUM BROMIDE AND ALBUTEROL SULFATE 2.5; .5 MG/3ML; MG/3ML
3 SOLUTION RESPIRATORY (INHALATION) EVERY 6 HOURS PRN
Status: DISCONTINUED | OUTPATIENT
Start: 2022-01-01 | End: 2022-01-01

## 2022-01-01 RX ORDER — IPRATROPIUM BROMIDE AND ALBUTEROL SULFATE 2.5; .5 MG/3ML; MG/3ML
3 SOLUTION RESPIRATORY (INHALATION) ONCE
Status: COMPLETED | OUTPATIENT
Start: 2022-01-01 | End: 2022-01-01

## 2022-01-01 RX ORDER — LATANOPROST 50 UG/ML
1 SOLUTION/ DROPS OPHTHALMIC NIGHTLY
COMMUNITY

## 2022-01-01 RX ORDER — SODIUM CHLORIDE 0.9 % (FLUSH) 0.9 %
10 SYRINGE (ML) INJECTION AS NEEDED
Status: DISCONTINUED | OUTPATIENT
Start: 2022-01-01 | End: 2022-01-01

## 2022-01-01 RX ORDER — MIRTAZAPINE 30 MG/1
30 TABLET, FILM COATED ORAL NIGHTLY
COMMUNITY

## 2022-01-01 RX ORDER — RISPERIDONE 0.5 MG/1
0.5 TABLET, FILM COATED ORAL NIGHTLY
Status: DISCONTINUED | OUTPATIENT
Start: 2022-01-01 | End: 2022-01-01

## 2022-01-01 RX ORDER — LATANOPROST 50 UG/ML
1 SOLUTION/ DROPS OPHTHALMIC NIGHTLY
Status: DISCONTINUED | OUTPATIENT
Start: 2022-01-01 | End: 2022-01-01

## 2022-01-01 RX ORDER — ALBUTEROL SULFATE 90 UG/1
4 AEROSOL, METERED RESPIRATORY (INHALATION) EVERY 4 HOURS PRN
Status: DISCONTINUED | OUTPATIENT
Start: 2022-01-01 | End: 2022-01-01

## 2022-01-01 RX ORDER — MIRTAZAPINE 30 MG/1
30 TABLET, FILM COATED ORAL NIGHTLY
Status: DISCONTINUED | OUTPATIENT
Start: 2022-01-01 | End: 2022-01-01

## 2022-01-01 RX ORDER — MIRTAZAPINE 7.5 MG/1
7.5 TABLET, FILM COATED ORAL NIGHTLY
Status: DISCONTINUED | OUTPATIENT
Start: 2022-01-01 | End: 2022-01-01

## 2022-01-12 PROBLEM — J18.9 PNEUMONIA OF RIGHT MIDDLE LOBE DUE TO INFECTIOUS ORGANISM: Status: ACTIVE | Noted: 2022-01-01

## 2022-01-12 NOTE — ED QUICK NOTES
Orders for admission, patient is aware of plan and ready to go upstairs. Any questions, please call ED RN Jacobo Hooker at extension [phone].      Patient Covid vaccination status: Unvaccinated     COVID Test Ordered in ED: Rapid SARS-CoV-2 by PCR    COVID Suspicion

## 2022-01-12 NOTE — ED PROVIDER NOTES
Patient Seen in: [facility] AND [facility] Emergency Department      History   Patient presents with:  Difficulty Breathing    Stated Complaint: sob    Subjective:   HPI  Patient is a 81-year-old male history of dementia, aphasia, psychosis, depression, frequent Effort: Pulmonary effort is normal. Tachypnea present. No respiratory distress. Breath sounds: Wheezing present. Comments: Coarse upper airway sounds, faint wheezing. Abdominal:      General: There is no distension. Tenderness:  There is no (CPT=71045)    Result Date: 1/12/2022  CONCLUSION:   Hypoexpanded lungs with patchy basilar ground-glass opacities and a more dense mix of consolidation and ground-glass in the medial right upper lobe.   The basilar opacities could be related to subsegmenta

## 2022-01-12 NOTE — SLP NOTE
Attempted to see patient for swallow evaluation, however, per RN patient is NPO for tests. Will attempt to see tomorrow.   Trena Mcneil MA/[facility]SLP  Speech Language Pathologist  Leana. [phone]

## 2022-01-12 NOTE — ED INITIAL ASSESSMENT (HPI)
Pt arrived via medics to rm 39 with 15l NRB for complaint of sob, per medics pt \"gasping\" as NH, pt is at baseline, [address] vaccination status unknown

## 2022-01-13 NOTE — H&P
[facility]    PATIENT'S NAME: Jaguar Willis   ATTENDING PHYSICIAN: Nicole Hanks MD   PATIENT ACCOUNT#:   [de-identified]    LOCATION:  [address] [place] [address] RECORD #:   [id number]       YOB: 1935  ADMISSION DATE:       01/12 JVD.  LUNGS:  The patient has bilateral coarse breath sounds. HEART:  S1 and S2.  Regular rate and rhythm. ABDOMEN:  Soft. Bowel sounds are present. EXTREMITIES:  No pedal edema. NEUROLOGIC:  The patient was alert. He does not follow any commands.

## 2022-01-13 NOTE — PHYSICAL THERAPY NOTE
PHYSICAL THERAPY EVALUATION - INPATIENT     Room Number: 559/559-A  Evaluation Date: 1/13/2022  Type of Evaluation: Initial   Physician Order: PT Eval and Treat    Presenting Problem: pneumonia R lobe, Covid + 1/12/22  Co-Morbidities : Dementia, aphasia, RECOMMENDATIONS  PT Discharge Recommendations: Long term care (total assist with all ADL's and mobility )    PLAN  PT Treatment Plan: Bed mobility; Body mechanics; Endurance; Energy conservation;Patient education;Strengthening;Transfer training;Balance traini Position: Sitting    O2 WALK  Oxygen Therapy  SPO2% on Room Air at Rest: 100  SPO2% Ambulation on Oxygen: 98 (with exercise AAROM at eob not ambulatory)    AM-PAC '6-Clicks' INPATIENT SHORT FORM - BASIC MOBILITY  How much difficulty does the patient evelina supine - sit EOB @ level: moderate assistance     Goal #1   Current Status Max A x 2   Goal #2 Patient is able to demonstrate transfers EOB to/from Chair/Wheelchair at assistance level: moderate assistance with 1 person     Goal #2  Current Status    Goal

## 2022-01-13 NOTE — PLAN OF CARE
No complaints of pain. On 2 liters oxygen, covid positive. Tele in place. Npo, frequent rounding done.  Bed alarm on  Problem: Patient Centered Care  Goal: Patient preferences are identified and integrated in the patient's plan of care  Description: Minor Conn

## 2022-01-13 NOTE — OCCUPATIONAL THERAPY NOTE
OCCUPATIONAL THERAPY EVALUATION - INPATIENT     Room Number: 559/559-A  Evaluation Date: 1/13/2022  Type of Evaluation: Initial  Presenting Problem: hypoxia;covid+    Physician Order: IP Consult to Occupational Therapy  Reason for Therapy: ADL/IADL Dysfunc documentation in the [facility] '6 clicks' Inpatient Daily Activity Short Form. Research supports that patients with this level of impairment may benefit from LTAC.      DISCHARGE RECOMMENDATIONS  OT Discharge Recommendations: Long term care  OT Device Recommen taking off regular lower body clothing?: Total  -   Bathing (including washing, rinsing, drying)?: Total  -   Toileting, which includes using toilet, bedpan or urinal? : Total  -   Putting on and taking off regular upper body clothing?: Total  -   Taking c

## 2022-01-13 NOTE — COVID NURSING ASSESSMENT
COVID-19 Daily Discharge Readiness-Nursing    O2 Sat at Rest:    96%   O2 Sat with Exertion:  96  % on    0 liters   Temperature max from last 24 hrs: Temp (24hrs), Av.1 °F (36.7 °C), Min:97.5 °F (36.4 °C), Max:98.5 °F (36.9 °C)    Inflammatory Markers

## 2022-01-13 NOTE — PLAN OF CARE
No acute changes overnight. Pt on room air and tolerating well. IV antibiotics continued. Frequent rounding by staff. All safety measures are in place. Call light is within reach.   Problem: Patient Centered Care  Goal: Patient preferences are identified an Incentive Spirometry  - Assess the need for suctioning and perform as needed  - Assess and instruct to report SOB or any respiratory difficulty  - Respiratory Therapy support as indicated  - Manage/alleviate anxiety  - Monitor for signs/symptoms of CO2 ret

## 2022-01-13 NOTE — PROGRESS NOTES
Attempted to reach daughter- no answer, no option to leave voicemail. phone number in epic is same as in nursing home paperwork

## 2022-01-13 NOTE — PLAN OF CARE
No complaints of pain. Npo. Tele in place, no calls. O2 sat 99%. Frequent rounding done. Vitals stable.  Attempted to reach daughter-no answer  Problem: Patient Centered Care  Goal: Patient preferences are identified and integrated in the patient's plan of management  - Manage/alleviate anxiety  - Utilize distraction and/or relaxation techniques  - Monitor for opioid side effects  - Notify MD/LIP if interventions unsuccessful or patient reports new pain  - Anticipate increased pain with activity and pre-medi

## 2022-01-13 NOTE — COVID NURSING ASSESSMENT
COVID-19 Daily Discharge Readiness-Nursing    O2 Sat at Rest:  SPO2% on Room Air at Rest: 100  %   O2 Sat with Exertion: SPO2% Ambulation on Oxygen: 98 (with exercise AAROM at eob not ambulatory)  % on    liters   Temperature max from last 24 hrs: Temp (24

## 2022-01-13 NOTE — PROGRESS NOTES
[facility]    Progress Note    [facility] Patient Status:  Inpatient    1935 MRN [id number]   Location [facility] 5SW/SE Attending David Yoon MD   Hosp Day # 1 PCP James Garcia MD       SUBJECTIVE:  Lay Valdovinos Subcutaneous, 2 times per day  dextrose 5 %-0.45 % NaCl infusion, , Intravenous, Continuous  Piperacillin Sod-Tazobactam So (ZOSYN) 3.375 g in dextrose 5% 100 mL IVPB-MBP, 3.375 g, Intravenous, Q8H  albuterol 108 (90 Base) MCG/ACT inhaler 4 puff, 4 puff, I

## 2022-01-13 NOTE — CM/SW NOTE
SW initiated self referral for discharge planning. Patient ws discussed during rounds and RN reports that patient admitted from [facility]. Updates sent via [employer].     Addendum 1/23/2022:  SW attempted to contact patient's daughter, Heather Fuentes, clearance    MDO for hospice received. [facility] lia[facility] aware of order. SW/CM to remain available for support and/or discharge planning.      CRISTINO Raza, [facility]   [id number]

## 2022-01-13 NOTE — SLP NOTE
Patient is NPO awaiting Swallowing Assessment. Attempted to see patient on this date. He would not awake for this writer despite utilizing sternal rub, wet cloth applied to face or increased vocal intensity. SLP to attempt on 1/14/22.     Leonid Becerra

## 2022-01-14 NOTE — PHYSICAL THERAPY NOTE
Attempted to see patient for therapy this date, discussed with RN who reports pt currently on bipap and with increased lethargy, pending EEG.  Patient is a long term care resident with continued limited participation, will discharge acute PT with recommenda

## 2022-01-14 NOTE — PLAN OF CARE
Pt responding to pain stimulus. MD rounded on pt and made aware of status. IV fluids and IV antibiotics continued. Frequent rounding by staff. All safety measures in place. Call light is within reach.    Problem: Patient Centered Care  Goal: Patient prefere Problem: PAIN - ADULT  Goal: Verbalizes/displays adequate comfort level or patient's stated pain goal  Description: INTERVENTIONS:  - Encourage pt to monitor pain and request assistance  - Assess pain using appropriate pain scale  - Administer analgesics

## 2022-01-14 NOTE — PAYOR COMM NOTE
--------------  CONTINUED STAY REVIEW    [facility]  Subscriber #:  [id number]  Authorization Number: [id number]    Admit date: 1/12/22  Admit time: 12:54 PM    Admitting Physician: Marie Jama MD  Attending Physician:  Marie Jama MD reduced JESSICA and high-risk of aspiration/choking.      At this time, pt presents with mod-severe oropharyngeal dysphagia.   D/t prolonged NPO status consider NG-tube    RECOMMENDATIONS   Diet Recommendations - Solids: NPO  Diet Recommendations - Liquids: NPO

## 2022-01-14 NOTE — SLP NOTE
ADULT SWALLOWING EVALUATION    ASSESSMENT    ASSESSMENT/OVERALL IMPRESSION:  PT COVID-19 POSITIVE. CONTACT AND DROPLET ISOLATION PPE REQUIRED. THIS THERAPIST WORE GOWN, GLOVES, FACE SHIELD, AND DROPLET/N95 RESPIRATOR MASK.  HANDS SANITIZED/WASHED UPON ENTRA Unknown  Precautions: Aspiration    Patient/Family Goals: Assess for safest and least restrictive diet consistencies       SWALLOWING HISTORY  Current Diet Consistency: NPO  Dysphagia History: None at [address] [place] [address]   Imaging Results:     CT BRAIN   CONCLUSION:   1. clinically. Videofluoroscopic Swallow Study is required to rule-out silent aspiration.)       SWALLOW GOALS  Goal #1 Patient/caregiver will adhere to NPO recommendations with non-oral medication and oral care 3x daily.    In Progress   Goal #2 SLP will reas

## 2022-01-14 NOTE — OCCUPATIONAL THERAPY NOTE
RN contacted regarding pt participation in therapy. Per RN, pt remains very lethargic. EEG pending. At this time pt does not present w/ skilled OT needs. Pt is a resident in [address] [place] [address] w/ plan to return when medically appropriate.  No further OT contact p

## 2022-01-14 NOTE — PROGRESS NOTES
[facility]    Progress Note    [facility] Patient Status:  Inpatient    1935 MRN [id number]   Location [facility] 5SW/SE Attending Radha Terrell MD   Hosp Day # 2 PCP Isidro Izquierdo MD       SUBJECTIVE:  Cindy Dus mental status. Much improved   Could also be from the COVID-19. Discussed with the nursing staff. Plan:   Plan continue current treatment. Discussed with the nursing staff.     Treva Littlejohn MD  1/14/2022

## 2022-01-14 NOTE — PAYOR COMM NOTE
--------------  ADMISSION REVIEW     So Mosquera MA O  Subscriber #:  [id number]  Authorization Number: [id number]    Admit date: 1/12/22  Admit time: 12:54 PM     REVIEW DOCUMENTATION:  ED Provider Notes signed by Madelyn Veras MD at 1/12/2022 11:1 and Rhythm: Normal rate and regular rhythm. Pulmonary:      Effort: Pulmonary effort is normal. Tachypnea present. No respiratory distress. Breath sounds: Wheezing present. Comments: Coarse upper airway sounds, faint wheezing.   Abdominal: treatment to ensure resolution and exclude any underlying neoplasm. MDM   62-year-old male history of dementia, aphasia, psychosis, frequent falls presenting from nursing home for difficulty breathing. Arrived in no acute distress.   Vitals with mild shortness of breath and he had also become hypoxic. The patient was brought to the emergency room. The patient was treated with antibiotics. The patient is now saturating well. He is at 96% on room air. He appears comfortable.  _______.     ALLERGIES: HCT 45.4 41.5   MCV 97.6 97.4   MCH 31.4 31.5   MCHC 32.2 32.3   RDW 12.7 12.9   NEPRELIM 10.07* 8.28*   WBC 11.5* 12.0*   .0 277.0   *  --      No complaints of pain. Npo. Tele in place, no calls. O2 sat 99%.     1/13/2022  3:40 PM  admitte 3 mL  Dose: 3 mL  Freq: Every 6 hours PRN Route: Nebulization  PRN Reasons: Wheezing,Shortness of Breath  Start: 01/12/22 1500 End: 01/12/22 1849   Order specific questions:    Which area/hospital [place]  Method of delivery Intermittent           [id number]

## 2022-01-15 NOTE — COVID NURSING ASSESSMENT
COVID-19 Daily Discharge Readiness-Nursing    O2 Sat at Rest:  SPO2% on Room Air at Rest: 100  %   O2 Sat with Exertion: SPO2% Ambulation on Oxygen: 98 (with exercise AAROM at eob not ambulatory)  % on  0  liters   Temperature max from last 24 hrs: Temp (2

## 2022-01-15 NOTE — PLAN OF CARE
Problem: RESPIRATORY - ADULT  Goal: Achieves optimal ventilation and oxygenation  Description: INTERVENTIONS:  - Assess for changes in respiratory status  - Assess for changes in mentation and behavior  - Position to facilitate oxygenation and minimize r affect risk of falls.   - [place] fall precautions as indicated by assessment.  - Educate pt/family on patient safety including physical limitations  - Instruct pt to call for assistance with activity based on assessment  - Modify environment to reduce ri

## 2022-01-15 NOTE — PLAN OF CARE
Patient awake, non-verbal, does not follow commands, on room air, sats: 97-99%, incontinent, primo fit intact  Problem:  Centered Care  Goal: Patient preferences are identified and integrated in the patient's plan of care  Description: Interventions:  - Wh support as indicated  - Manage/alleviate anxiety  - Monitor for signs/symptoms of CO2 retention  Outcome: Progressing     Problem: PAIN - ADULT  Goal: Verbalizes/displays adequate comfort level or patient's stated pain goal  Description: INTERVENTIONS:  -

## 2022-01-15 NOTE — PROCEDURES
EEG report    REFERRING PHYSICIAN: Marie Jama MD    PCP and phone number:  Alma Hicks MD  [phone]    TECHNIQUE: 21 channels of EEG, 2 channels of EOG, and 1 channel of EKG were recorded utilizing the International 10/20 System.  The rec

## 2022-01-15 NOTE — SLP NOTE
ADULT SWALLOWING EVALUATION    ASSESSMENT    ASSESSMENT/OVERALL IMPRESSION:  Orders were received for a bedside swallowing evaluation. Attempts had been made x 3 since 1/12 however patient too lethargic to participate.  Patient with improved alertness and w Mask over N95, Gloves, Gown, Goggles. Patient without a mask due to nature of swallowing assessment.         RECOMMENDATIONS   Diet Recommendations - Solids: Puree  Diet Recommendations - Liquids: Nectar thick liquids/ Mildly thick     Oral Hygiene should b Presentation: Spoon  Patient Positioning: Upright;Midline    Oral Phase of Swallow: Impaired  Bolus Retrieval: Intact  Bilabial Seal: Intact  Bolus Formation: Intact  Bolus Propulsion: Impaired     Retention: Intact    Pharyngeal Phase of Swallow:  Within F

## 2022-01-15 NOTE — PROGRESS NOTES
[facility]    Progress Note    [facility] Patient Status:  Inpatient    1935 MRN [id number]   Location [facility] 5SW/SE Attending Tarsha Pack MD   Hosp Day # 3 PCP Josias Andres MD       SUBJECTIVE:  Jameel Candelaria Sod-Tazobactam So (ZOSYN) 3.375 g in dextrose 5% 100 mL IVPB-MBP, 3.375 g, Intravenous, Q8H  albuterol 108 (90 Base) MCG/ACT inhaler 4 puff, 4 puff, Inhalation, Q4H PRN        Assessment  Patient Active Problem List:     Pneumonia of right middle lobe due

## 2022-01-15 NOTE — COVID NURSING ASSESSMENT
COVID-19 Daily Discharge Readiness-Nursing    O2 Sat at Rest:  99%  O2 Sat with Exertion: SPO2% Ambulation on Oxygen: 98 (with exercise AAROM at eob not ambulatory)  % on : NA  Temperature max from last 24 hrs: Temp (24hrs), Av.7 °F (36.5 °C), Min:97. 1

## 2022-01-16 NOTE — PLAN OF CARE
Problem: Patient Centered Care  Goal: Patient preferences are identified and integrated in the patient's plan of care  Description: Interventions:  - What would you like us to know as we care for you?  From [facility]  - Provide timely, comp Progressing     Problem: PAIN - ADULT  Goal: Verbalizes/displays adequate comfort level or patient's stated pain goal  Description: INTERVENTIONS:  - Encourage pt to monitor pain and request assistance  - Assess pain using appropriate pain scale  - Adminis

## 2022-01-16 NOTE — PLAN OF CARE
Problem: Patient Centered Care  Goal: Patient preferences are identified and integrated in the patient's plan of care  Description: Interventions:  - What would you like us to know as we care for you?  From [facility]  - Provide timely, comp Assess the need for suctioning and perform as needed  - Assess and instruct to report SOB or any respiratory difficulty  - Respiratory Therapy support as indicated  - Manage/alleviate anxiety  - Monitor for signs/symptoms of CO2 retention  Outcome: [facility] or other facility with appropriate resources  Description: INTERVENTIONS:  - Identify barriers to discharge w/pt and caregiver  - Include patient/family/discharge partner in discharge planning  - Arrange for needed discharge resources and transportati INTERVENTIONS:  - Assess and document risk factors for pressure ulcer development  - Assess and document skin integrity  - Assess and document dressing/incision, wound bed, drain sites and surrounding tissue  - Implement wound care per orders  - Initiate i

## 2022-01-16 NOTE — PROGRESS NOTES
[facility]    Progress Note    [facility] Patient Status:  Inpatient    1935 MRN [id number]   Location [facility] 5SW/SE Attending Tarsha Pack MD   Hosp Day # 4 PCP Josias Andres MD       SUBJECTIVE:  Alert antibiotics. Altered mental status. Much improved   Could also be from the COVID-19. Discussed with the nursing staff. Plan:   continue current treatment. Discussed with the nursing staff.     Jorge Grace MD  1/16/2022

## 2022-01-16 NOTE — PLAN OF CARE
Problem: Patient Centered Care  Goal: Patient preferences are identified and integrated in the patient's plan of care  Description: Interventions:  - What would you like us to know as we care for you?  From [facility]  - Provide timely, comp Assess the need for suctioning and perform as needed  - Assess and instruct to report SOB or any respiratory difficulty  - Respiratory Therapy support as indicated  - Manage/alleviate anxiety  - Monitor for signs/symptoms of CO2 retention  Outcome: [facility] or other facility with appropriate resources  Description: INTERVENTIONS:  - Identify barriers to discharge w/pt and caregiver  - Include patient/family/discharge partner in discharge planning  - Arrange for needed discharge resources and transportati INTERVENTIONS:  - Assess and document risk factors for pressure ulcer development  - Assess and document skin integrity  - Assess and document dressing/incision, wound bed, drain sites and surrounding tissue  - Implement wound care per orders  - Initiate i

## 2022-01-17 NOTE — PLAN OF CARE
Problem: Patient Centered Care  Goal: Patient preferences are identified and integrated in the patient's plan of care  Description: Interventions:  - What would you like us to know as we care for you?  From [facility]  - Provide timely, comp Assess the need for suctioning and perform as needed  - Assess and instruct to report SOB or any respiratory difficulty  - Respiratory Therapy support as indicated  - Manage/alleviate anxiety  - Monitor for signs/symptoms of CO2 retention  Outcome: [facility] or other facility with appropriate resources  Description: INTERVENTIONS:  - Identify barriers to discharge w/pt and caregiver  - Include patient/family/discharge partner in discharge planning  - Arrange for needed discharge resources and transportati INTERVENTIONS:  - Assess and document risk factors for pressure ulcer development  - Assess and document skin integrity  - Assess and document dressing/incision, wound bed, drain sites and surrounding tissue  - Implement wound care per orders  - Initiate i

## 2022-01-17 NOTE — PROGRESS NOTES
Occupational Therapy Discharge Summary    Reason for therapy discharge:    Discharged to return to [facility] with continued services.     Progress towards therapy goal(s). See goals on Care Plan in [facility] electronic health record for goal details.  Goals partially met.  Barriers to achieving goals:   discharge from facility.    Therapy recommendation(s):    No further therapy is recommended.       [facility]    Progress Note    [facility] Patient Status:  Inpatient    1935 MRN [id number]   Location [facility] 5SW/SE Attending Nichol Villalobos MD   Hosp Day # 5 PCP Chang Lundy MD       SUBJECTIVE:  Alert 103*  --  91    < > = values in this interval not displayed.        Imaging:  CT brain no active     Meds:   Heparin Sodium (Porcine) 5000 UNIT/ML injection 5,000 Units, 5,000 Units, Subcutaneous, 2 times per day  Piperacillin Sod-Tazobactam So (ZOSYN) 3.37

## 2022-01-18 NOTE — PLAN OF CARE
Problem: Patient Centered Care  Goal: Patient preferences are identified and integrated in the patient's plan of care  Description: Interventions:  - What would you like us to know as we care for you?  From [facility]  - Provide timely, comple Assess the need for suctioning and perform as needed  - Assess and instruct to report SOB or any respiratory difficulty  - Respiratory Therapy support as indicated  - Manage/alleviate anxiety  - Monitor for signs/symptoms of CO2 retention  Outcome: [facility] or other facility with appropriate resources  Description: INTERVENTIONS:  - Identify barriers to discharge w/pt and caregiver  - Include patient/family/discharge partner in discharge planning  - Arrange for needed discharge resources and transportati INTERVENTIONS:  - Assess and document risk factors for pressure ulcer development  - Assess and document skin integrity  - Assess and document dressing/incision, wound bed, drain sites and surrounding tissue  - Implement wound care per orders  - Initiate i

## 2022-01-18 NOTE — PROGRESS NOTES
[facility] - [facility]    Progress Note    [facility] Patient Status:  Inpatient    1935 MRN [id number]   Location [facility] 5SW/SE Attending Radha Terrell MD   Hosp Day # 6 PCP Isidro Izquierdo MD       SUBJECTIVE:  Alert --  12.8*   .0 222.0  --  231.0   *  --  91 102*       Imaging:  CT brain no active     Meds:   Heparin Sodium (Porcine) 5000 UNIT/ML injection 5,000 Units, 5,000 Units, Subcutaneous, 2 times per day  Piperacillin Sod-Tazobactam So (ZOSYN) 3.

## 2022-01-19 NOTE — CM/SW NOTE
01/19/22 1600   CM/SW Referral Data   Referral Source    Reason for Referral Discharge planning   Informant Father;EMR;Clinical Staff Member   Pertinent Medical Hx   Does patient have an established PCP?  Yes  March Poli)   Patient Info [facility], [address] resident. Pt's dc is being held due poor intake. Updates sent [address] Lulu Case [address]. Possible need for palliative consult? Plan: [address] [facility] pending medical clearance.     / to remain availabl

## 2022-01-19 NOTE — PLAN OF CARE
Problem: Patient Centered Care  Goal: Patient preferences are identified and integrated in the patient's plan of care  Description: Interventions:  - What would you like us to know as we care for you?  From [facility]  - Provide timely, comp Assess the need for suctioning and perform as needed  - Assess and instruct to report SOB or any respiratory difficulty  - Respiratory Therapy support as indicated  - Manage/alleviate anxiety  - Monitor for signs/symptoms of CO2 retention  Outcome: [facility] or other facility with appropriate resources  Description: INTERVENTIONS:  - Identify barriers to discharge w/pt and caregiver  - Include patient/family/discharge partner in discharge planning  - Arrange for needed discharge resources and transportati INTERVENTIONS:  - Assess and document risk factors for pressure ulcer development  - Assess and document skin integrity  - Assess and document dressing/incision, wound bed, drain sites and surrounding tissue  - Implement wound care per orders  - Initiate i

## 2022-01-19 NOTE — DIETARY NOTE
ADULT NUTRITION INITIAL ASSESSMENT    Pt is at moderate nutrition risk. Pt does not meet malnutrition criteria.       RECOMMENDATIONS TO MD:  Nutrition Support (NGT) is indicated if in accordance with pt/family goc/wishes    ADMITTING DIAGNOSIS:   Pneumoni Vitamin D3 (Cholecalciferol)  5,000 Units Oral Daily   • mirtazapine  7.5 mg Oral Nightly   • Heparin Sodium (Porcine)  5,000 Units Subcutaneous 2 times per day   • piperacillin-tazobactam  3.375 g Intravenous Q8H     LABS: reviewed  Recent Labs     01/17/ Medical Food Supplements-RD added Magic Cup (290 calories/ 9 g protein each) Daily [place] and Mighty Shake (300 calories/ 9 g protein each) BID Vanilla or Chocolate. Rational/use of oral supplements discussed.   - Vitamin and mineral supplements: multivitam

## 2022-01-20 NOTE — COVID NURSING ASSESSMENT
COVID-19 Daily Discharge Readiness-Nursing    O2 Sat at Rest:  SPO2% on Room Air at Rest: 100  %   O2 Sat with Exertion: % on   liters   Temperature max from last 24 hrs: Temp (24hrs), Av.8 °F (36.6 °C), Min:97.6 °F (36.4 °C), Max:97.9 °F (36.6 °C)

## 2022-01-20 NOTE — PLAN OF CARE
Problem: Patient Centered Care  Goal: Patient preferences are identified and integrated in the patient's plan of care  Description: Interventions:  - What would you like us to know as we care for you?  From [facility]  - Provide timely, comp Assess the need for suctioning and perform as needed  - Assess and instruct to report SOB or any respiratory difficulty  - Respiratory Therapy support as indicated  - Manage/alleviate anxiety  - Monitor for signs/symptoms of CO2 retention  Outcome: [facility] or other facility with appropriate resources  Description: INTERVENTIONS:  - Identify barriers to discharge w/pt and caregiver  - Include patient/family/discharge partner in discharge planning  - Arrange for needed discharge resources and transportati INTERVENTIONS:  - Assess and document risk factors for pressure ulcer development  - Assess and document skin integrity  - Assess and document dressing/incision, wound bed, drain sites and surrounding tissue  - Implement wound care per orders  - Initiate i

## 2022-01-20 NOTE — PROGRESS NOTES
[facility]    Progress Note    [facility] Patient Status:  Inpatient    1935 MRN [id number]   Location [facility] 5SW/SE Attending Eugenio Gibbs MD   Hosp Day # 7 PCP Urban Ro MD       SUBJECTIVE:  Melecio Corbett not displayed.        Imaging:  CT brain no active     Meds:   multivitamin with minerals (Thera M Plus) tab 1 tablet, 1 tablet, Oral, Daily  cyanocobalamine (VITAMIN B12) tab 500 mcg, 500 mcg, Oral, Daily  donepezil (ARICEPT) tab 10 mg, 10 mg, Oral, Nightl

## 2022-01-20 NOTE — PROGRESS NOTES
[facility]    Progress Note    [facility] Patient Status:  Inpatient    1935 MRN [id number]   Location [facility] 5SW/SE Attending Billy Petit MD   Hosp Day # 8 PCP Ruchi Street MD       SUBJECTIVE:  Martita Hearn Oral, Daily  cyanocobalamine (VITAMIN B12) tab 500 mcg, 500 mcg, Oral, Daily  donepezil (ARICEPT) tab 10 mg, 10 mg, Oral, Nightly  latanoprost (XALATAN) 0.005 % ophthalmic solution 1 drop, 1 drop, Left Eye, Nightly  risperiDONE (RISPERDAL) tab 0.5 mg, 0.5

## 2022-01-20 NOTE — PLAN OF CARE
Problem: Patient Centered Care  Goal: Patient preferences are identified and integrated in the patient's plan of care  Description: Interventions:  - What would you like us to know as we care for you?  From [facility]  - Provide timely, comp Assess the need for suctioning and perform as needed  - Assess and instruct to report SOB or any respiratory difficulty  - Respiratory Therapy support as indicated  - Manage/alleviate anxiety  - Monitor for signs/symptoms of CO2 retention  Outcome: [facility] or other facility with appropriate resources  Description: INTERVENTIONS:  - Identify barriers to discharge w/pt and caregiver  - Include patient/family/discharge partner in discharge planning  - Arrange for needed discharge resources and transportati Use visual cues when possible  - Listen attentively, be patient, do not interrupt  - Minimize distractions  - Allow time for understanding and response  - Establish method for patient to ask for assistance (call light)  - Provide an  as needed

## 2022-01-20 NOTE — PLAN OF CARE
Pt alert and oriented x1. Nonverbal. No acute changes at this time. Pt shows no signs of pain. Frequent rounding by nurses. Safety and fall precautions in place and bed alarm on.   Problem: Patient Centered Care  Goal: Patient preferences are identified and based on oxygen saturation or ABGs  - Provide Smoking Cessation handout, if applicable  - Encourage broncho-pulmonary hygiene including cough, deep breathe, Incentive Spirometry  - Assess the need for suctioning and perform as needed  - Assess and instruct appropriate  - Consider OT/PT consult to assist with strengthening/mobility  - Encourage toileting schedule  Outcome: Progressing     Problem: DISCHARGE PLANNING  Goal: Discharge to home or other facility with appropriate resources  Description: INTERVENTI interventions, skin care algorithm/standards of care as needed  Outcome: Progressing  Goal: Incision(s), wounds(s) or drain site(s) healing without S/S of infection  Description: INTERVENTIONS:  - Assess and document risk factors for pressure ulcer develop

## 2022-01-21 PROBLEM — Z71.89 GOALS OF CARE, COUNSELING/DISCUSSION: Status: ACTIVE | Noted: 2022-01-01

## 2022-01-21 PROBLEM — Z71.89 ADVANCE CARE PLANNING: Status: ACTIVE | Noted: 2022-01-01

## 2022-01-21 NOTE — CONSULTS
[facility] Patient Status:  Inpatient    1935 MRN [id number]   Location [facility] 5SW/SE Attending Richard Lutz MD   Hosp Day # 5 PCP Yoan Iraheta MD     Date o     Allergies:  No Known Allergies    Medications:     Current Facility-Administered Medications:   •  dextrose 5 %-0.45 % NaCl infusion, , Intravenous, Continuous  •  multivitamin with minerals (Thera M Plus) tab 1 tablet, 1 tablet, Oral, Daily  • lobe   opacity suspicious for pneumonia.      1/13/22 CT brain  CONCLUSION:   1. No acute intracranial finding. 2. Moderate changes of chronic small vessel disease in cerebral white matter.    3. Atherosclerotic calcification of cavernous carotid arteries discussions      I ATTEMPTED TO REACH PT'S DTR TONE BY PHONE #[phone] x2, PHONE RINGS ONCE BUT THEN GOES TO BUSY SIGNAL. I ASKED RN TO LET DTR KNOW IF SHE CALLS THAT PALLIATIVE CARE WAS CONSULTED AND WOULD LIKE TO SPEAK WITH HER REGARDING GOC.   Vicenta [id number]  1/21/2022  9:16 AM

## 2022-01-21 NOTE — PLAN OF CARE
Problem: Patient Centered Care  Goal: Patient preferences are identified and integrated in the patient's plan of care  Description: Interventions:  - What would you like us to know as we care for you?  From [facility]  - Provide timely, comp Spirometry  - Assess the need for suctioning and perform as needed  - Assess and instruct to report SOB or any respiratory difficulty  - Respiratory Therapy support as indicated  - Manage/alleviate anxiety  - Monitor for signs/symptoms of CO2 retention  Felicia Brown during anticipated neutropenic period  Description: INTERVENTIONS  - Monitor WBC  - Administer growth factors as ordered  - Implement neutropenic guidelines  Outcome: Not Progressing     Problem: SAFETY ADULT - FALL  Goal: Free from fall injury  Descriptio communication aides and strategies  - Use visual cues when possible  - Listen attentively, be patient, do not interrupt  - Minimize distractions  - Allow time for understanding and response  - Establish method for patient to ask for assistance (call light)

## 2022-01-21 NOTE — PROGRESS NOTES
[facility]    Progress Note    [facility] Patient Status:  Inpatient    1935 MRN [id number]   Location [facility] 5SW/SE Attending Nichol Villalobos MD   Hosp Day # 5 PCP Chang Lundy MD       SUBJECTIVE:  Altaf Denise tablet, 1 tablet, Oral, Daily  cyanocobalamine (VITAMIN B12) tab 500 mcg, 500 mcg, Oral, Daily  donepezil (ARICEPT) tab 10 mg, 10 mg, Oral, Nightly  latanoprost (XALATAN) 0.005 % ophthalmic solution 1 drop, 1 drop, Left Eye, Nightly  risperiDONE (RISPERDAL 165

## 2022-01-21 NOTE — PLAN OF CARE
No acute changes overnight. IV fluids and antibiotics continued. Alls safety measures are in place. Call light is within reach.    Problem: Patient Centered Care  Goal: Patient preferences are identified and integrated in the patient's plan of care  Siomara Chand Cessation handout, if applicable  - Encourage broncho-pulmonary hygiene including cough, deep breathe, Incentive Spirometry  - Assess the need for suctioning and perform as needed  - Assess and instruct to report SOB or any respiratory difficulty  - Respir strengthening/mobility  - Encourage toileting schedule  Outcome: Progressing     Problem: DISCHARGE PLANNING  Goal: Discharge to home or other facility with appropriate resources  Description: INTERVENTIONS:  - Identify barriers to discharge w/pt and careg needed  Outcome: Progressing  Goal: Incision(s), wounds(s) or drain site(s) healing without S/S of infection  Description: INTERVENTIONS:  - Assess and document risk factors for pressure ulcer development  - Assess and document skin integrity  - Assess and

## 2022-01-21 NOTE — SLP NOTE
SPEECH DAILY NOTE - INPATIENT    ASSESSMENT & PLAN   ASSESSMENT      PT IS COVID 19 +. PPE REQUIRED. THIS SLP WORE GLOVES, GOWN, FACE SHIELD AND DROPLET MASK OVER N95 MASK. HANDS SANITIZED/WASHED UPON ENTRANCE/EXIT.     Pt lethargic, afebrile and on room ai puree consistency and mildl thick liquids without overt signs or symptoms of aspiration with 95 % accuracy over 1-2 session(s). Pt accepted limited trials; orally defensive. No CSA on the limited trials. Significant oral phase deficits.     GOAL MET

## 2022-01-22 NOTE — PLAN OF CARE
No acute changes overnight. Frequent rounding by staff. Video monitoring. All safety measures in place. Call light within reach.    Problem: Patient Centered Care  Goal: Patient preferences are identified and integrated in the patient's plan of care  Eileen Cessation handout, if applicable  - Encourage broncho-pulmonary hygiene including cough, deep breathe, Incentive Spirometry  - Assess the need for suctioning and perform as needed  - Assess and instruct to report SOB or any respiratory difficulty  - Respir strengthening/mobility  - Encourage toileting schedule  Outcome: Progressing     Problem: DISCHARGE PLANNING  Goal: Discharge to home or other facility with appropriate resources  Description: INTERVENTIONS:  - Identify barriers to discharge w/pt and careg needed  Outcome: Progressing  Goal: Incision(s), wounds(s) or drain site(s) healing without S/S of infection  Description: INTERVENTIONS:  - Assess and document risk factors for pressure ulcer development  - Assess and document skin integrity  - Assess and

## 2022-01-22 NOTE — PLAN OF CARE
Frequent rounding by staff. Poor po intake. No meds given, MD aware. Safety precautions and camera monitoring in place. Problem: Patient Centered Care  Goal: Patient preferences are identified and integrated in the patient's plan of care  Description:  In and oxygenation  Description: INTERVENTIONS:  - Assess for changes in respiratory status  - Assess for changes in mentation and behavior  - Position to facilitate oxygenation and minimize respiratory effort  - Oxygen supplementation based on oxygen saturat RN  Outcome: Progressing     Problem: SAFETY ADULT - FALL  Goal: Free from fall injury  Description: INTERVENTIONS:  - Assess pt frequently for physical needs  - Identify cognitive and physical deficits and behaviors that affect risk of falls.   - [place] and caregiver  - Include patient/family/discharge partner in discharge planning  - Arrange for needed discharge resources and transportation as appropriate  - Identify discharge learning needs (meds, wound care, etc)  - Arrange for interpreters to assist a Progressing

## 2022-01-23 NOTE — PLAN OF CARE
Lethargic, but awakes to verbal stimuli. Took PO medications last night. No pain. Vitals stable. Safety precautions in place. Call light within reach. Frequent rounding done.     Problem: Patient Centered Care  Goal: Patient preferences are identified and i influences on pain and pain management  - Manage/alleviate anxiety  - Utilize distraction and/or relaxation techniques  - Monitor for opioid side effects  - Notify MD/LIP if interventions unsuccessful or patient reports new pain  - Anticipate increased valerie post-hospital services based on physician/LIP order or complex needs related to functional status, cognitive ability or social support system  Outcome: Progressing     Problem: Altered Communication/Language Barrier  Goal: Patient/Family is able to [employer]  Obtain nutritional consult as needed  - Optimize oral hygiene and moisture  - Encourage food from home; allow for food preferences  - Enhance eating environment  Outcome: Progressing

## 2022-01-23 NOTE — PROGRESS NOTES
[facility]    Progress Note    [facility] Patient Status:  Inpatient    1935 MRN [id number]   Location [facility] 5SW/SE Attending Manny Barker MD   Hosp Day # 6 PCP Rafal Zhong MD       SUBJECTIVE:  Tarri Height (RISPERDAL) tab 1 mg, 1 mg, Oral, 2 times per day  Vitamin D3 (Cholecalciferol) cap 5,000 Units, 5,000 Units, Oral, Daily  mirtazapine (REMERON) tab 7.5 mg, 7.5 mg, Oral, Nightly  Heparin Sodium (Porcine) 5000 UNIT/ML injection 5,000 Units, 5,000 Units, Christina

## 2022-01-23 NOTE — PROGRESS NOTES
[place] [facility]    Progress Note    [facility] Patient Status:  Inpatient    1935 MRN [id number]   Location [facility] 5SW/SE Attending Marisol Vegas MD   Hosp Day # 8 PCP Himanshu Bush MD       SUBJECTIVE:  Resp Daily  cyanocobalamine (VITAMIN B12) tab 500 mcg, 500 mcg, Oral, Daily  donepezil (ARICEPT) tab 10 mg, 10 mg, Oral, Nightly  latanoprost (XALATAN) 0.005 % ophthalmic solution 1 drop, 1 drop, Left Eye, Nightly  risperiDONE (RISPERDAL) tab 0.5 mg, 0.5 mg, Or

## 2022-01-23 NOTE — PLAN OF CARE
Pt. Took PO medications as ordered. Frequent rounding by staff. Safety precautions and camera monitoring in place.    Problem: Patient Centered Care  Goal: Patient preferences are identified and integrated in the patient's plan of care  Description: Maria Esther Amin handout, if applicable  - Encourage broncho-pulmonary hygiene including cough, deep breathe, Incentive Spirometry  - Assess the need for suctioning and perform as needed  - Assess and instruct to report SOB or any respiratory difficulty  - Respiratory Ther strengthening/mobility  - Encourage toileting schedule  Outcome: Progressing     Problem: DISCHARGE PLANNING  Goal: Discharge to home or other facility with appropriate resources  Description: INTERVENTIONS:  - Identify barriers to discharge w/pt and careg Interventions:  - Assess communication ability and preferred communication style  - Implement communication aides and strategies  - Use visual cues when possible  - Listen attentively, be patient, do not interrupt  - Minimize distractions  - Allow time for

## 2022-01-24 NOTE — PLAN OF CARE
No acute changes. Tele discontinued and pt still lethargic and unable to tolerate oral intake. GI placed on consult with no plans for PEG tube. Talked with daughter Janae Bautista and updated on plan of care. Plan for hospice meeting. Turned throughout shift. oxygenation  Description: INTERVENTIONS:  - Assess for changes in respiratory status  - Assess for changes in mentation and behavior  - Position to facilitate oxygenation and minimize respiratory effort  - Oxygen supplementation based on oxygen saturation assessment.  - Educate pt/family on patient safety including physical limitations  - Instruct pt to call for assistance with activity based on assessment  - Modify environment to reduce risk of injury  - Provide assistive devices as appropriate  - Consider Skin integrity remains intact  Description: INTERVENTIONS  - Assess and document risk factors for pressure ulcer development  - Assess and document skin integrity  - Monitor for areas of redness and/or skin breakdown  - Initiate interventions, skin care al

## 2022-01-24 NOTE — CONSULTS
[facility]    Report of Consultation    [facility] Patient Status:  Inpatient    1935 MRN [id number]   Location [facility] 5SW/SE Attending Madelin Bills MD   Hosp Day # 15 PCP Crow Rizo MD     Date of Admi Nightly  Heparin Sodium (Porcine) 5000 UNIT/ML injection 5,000 Units, 5,000 Units, Subcutaneous, 2 times per day  albuterol 108 (90 Base) MCG/ACT inhaler 4 puff, 4 puff, Inhalation, Q4H PRN      Vitamin D3, Cholecalciferol, 125 MCG (5000 UT) Oral Cap, Take (05749)    Result Date: 1/13/2022  PROCEDURE: CT BRAIN OR HEAD (CPT=70450)  COMPARISON: None.   INDICATIONS: Transient alteration of awareness, altered mental status  TECHNIQUE: CT images were obtained without contrast material.  Automated exposure control adenopathy. LUNGS/PLEURA: Lungs are moderately hypoexpanded. Patchy ground-glass opacities are noted in the lower lobes bilaterally and to a greater extent in the medial right upper lobe where there is also a developing more dense consolidation.   Negative

## 2022-01-24 NOTE — PROGRESS NOTES
[facility]OS[facility] - [facility]    Progress Note    [facility] Patient Status:  Inpatient    1935 MRN [id number]   Location [facility] 5SW/SE Attending Cordell Payne MD   Hosp Day # 15 PCP Rachel English MD       SUBJECTIVE:  Angelito Whitney tab 1 tablet, 1 tablet, Oral, Daily  cyanocobalamine (VITAMIN B12) tab 500 mcg, 500 mcg, Oral, Daily  donepezil (ARICEPT) tab 10 mg, 10 mg, Oral, Nightly  latanoprost (XALATAN) 0.005 % ophthalmic solution 1 drop, 1 drop, Left Eye, Nightly  risperiDONE (RIS

## 2022-01-24 NOTE — PALLIATIVE CARE NOTE
Discussed case with Dr. Danyell Owens who says that patient's daughter Nimisha Hopper is agreeable to obtaining hospice information. I called and spoke to [facility] representative Anuja Monae who will reach out to daughter/HCPISIDRO Hopper at [phone].     Shraddha De La Cruz

## 2022-01-24 NOTE — PLAN OF CARE
No acute changes overnight. Tolerated meds PO. Frequent rounding by staff. All safety measures are in place.   Problem: Patient Centered Care  Goal: Patient preferences are identified and integrated in the patient's plan of care  Description: Interventions: if applicable  - Encourage broncho-pulmonary hygiene including cough, deep breathe, Incentive Spirometry  - Assess the need for suctioning and perform as needed  - Assess and instruct to report SOB or any respiratory difficulty  - Respiratory Therapy suppo strengthening/mobility  - Encourage toileting schedule  Outcome: Progressing     Problem: DISCHARGE PLANNING  Goal: Discharge to home or other facility with appropriate resources  Description: INTERVENTIONS:  - Identify barriers to discharge w/pt and careg needed  Outcome: Progressing  Goal: Incision(s), wounds(s) or drain site(s) healing without S/S of infection  Description: INTERVENTIONS:  - Assess and document risk factors for pressure ulcer development  - Assess and document skin integrity  - Assess and

## 2022-01-24 NOTE — PROGRESS NOTES
[facility] received [facility] for hospice for patient. Daughter would like to talk to nursing staff and potentially get permission to come see her dad before making a decision.      Sara Barrera  [facility]idential Liaison   [id number]

## 2022-01-25 NOTE — DIETARY NOTE
Follow  up Nutrition Note         1/25/2022:  Patient poorly eating. Often refusing meals. Negligible nutrition intake. Once documented with intake of 100% Mighty shake on 1/23, no documented intake thereafter.  (See below meal intake)     No plan for Recommendation:    Continue current Nutrition management. RD will continue to follow up.        Casimiro Monson, [address] [place] [address], [address] [place] [address]   Clinical Dietitian  [phone]  1/25/2022

## 2022-01-25 NOTE — HOSPICE RN NOTE
[facility]: spoke to daughter Joe Bolden over the phone. Joe Kimi had concerns regarding private pay room and board for return to [facility] on hospice. Pt [facility]vath who states pt GIP appropriate for pain and labored breathing.  Joe Bolden updated o

## 2022-01-25 NOTE — PLAN OF CARE
Monitoring vital signs per protocol. No acute changes at this time. Pt on room air, mostly nonverbal & lethargic. Pt responds to painful stimuli. Pt opened his eyes briefly but refused PO intake & medications.  Fall precautions in place: bed in lowest posit oxygenation  Description: INTERVENTIONS:  - Assess for changes in respiratory status  - Assess for changes in mentation and behavior  - Position to facilitate oxygenation and minimize respiratory effort  - Oxygen supplementation based on oxygen saturation assessment.  - Educate pt/family on patient safety including physical limitations  - Instruct pt to call for assistance with activity based on assessment  - Modify environment to reduce risk of injury  - Provide assistive devices as appropriate  - Consider Skin integrity remains intact  Description: INTERVENTIONS  - Assess and document risk factors for pressure ulcer development  - Assess and document skin integrity  - Monitor for areas of redness and/or skin breakdown  - Initiate interventions, skin care al

## 2022-01-25 NOTE — PLAN OF CARE
Pt awake enough this afternoon to take a few bites of nutritional supplement. All safety measures in place. Discharge pending hospice plan. Per hospice team, meeting with daughter at 11am tomorrow.   Attempted to get a hold of Venkat Tena for an update, no answer facilitate oxygenation and minimize respiratory effort  - Oxygen supplementation based on oxygen saturation or ABGs  - Provide Smoking Cessation handout, if applicable  - Encourage broncho-pulmonary hygiene including cough, deep breathe, Incentive Spiromet assessment  - Modify environment to reduce risk of injury  - Provide assistive devices as appropriate  - Consider OT/PT consult to assist with strengthening/mobility  - Encourage toileting schedule  Outcome: Progressing     Problem: [address] Tan document skin integrity  - Monitor for areas of redness and/or skin breakdown  - Initiate interventions, skin care algorithm/standards of care as needed  Outcome: Progressing  Goal: Incision(s), wounds(s) or drain site(s) healing without S/S of infection

## 2022-01-25 NOTE — HOSPICE RN NOTE
Discussed with Kamille Zayas RN if pt's daughter was going to be allowed to visit. Kamille Zayas relates that daughter stated she would visit pt at [employer]. Kamille Zayas states that discussion of g tube placement took place but did not indicate if decision was made.   Kamille Zayas re

## 2022-01-26 PROBLEM — J96.90 RESPIRATORY FAILURE (HCC): Status: ACTIVE | Noted: 2022-01-01

## 2022-01-26 NOTE — PLAN OF CARE
PRN Morphine given per MD request. Sclopamine patch behind right ear. No c/o pain. Pt changed to DNAR comfort care. Hospice on consult.

## 2022-01-26 NOTE — HOSPICE RN NOTE
[facility] nursing visit for follow up on hospice consult order. Patient appropriate for hospice with dx of respiratory failure and appropriate for inpatient hospice for management of respiratory distress, pain, and anxiety.  Consents for hospice s

## 2022-01-26 NOTE — PLAN OF CARE
Vital signs stable. Pt more alert tonight, ate whole cup of applesauce with HS meds. Safety precautions in place, frequent repositioning. Hospice meeting planned for tomorrow. Frequent rounding by nursing staff.      Problem: Patient Centered Care  Goal: Pa RN  Outcome: Progressing     Problem: RESPIRATORY - ADULT  Goal: Achieves optimal ventilation and oxygenation  Description: INTERVENTIONS:  - Assess for changes in respiratory status  - Assess for changes in mentation and behavior  - Position to facilitate guidelines  1/26/2022 0134 by Barbara Ellis RN  Outcome: Progressing  1/26/2022 0134 by Barbara Ellis RN  Outcome: Progressing     Problem: SAFETY ADULT - FALL  Goal: Free from fall injury  Description: INTERVENTIONS:  - Assess pt frequently for physic and participate in their care  Description: Interventions:  - Assess communication ability and preferred communication style  - Implement communication aides and strategies  - Use visual cues when possible  - Listen attentively, be patient, do not interrup moisture  - Encourage food from home; allow for food preferences  - Enhance eating environment  Outcome: Progressing

## 2022-01-26 NOTE — SPIRITUAL CARE NOTE
Fco Shearer visited with pt. Conferred with RN. Observed pt lying in bed non responsive. No family present. Pt experiencing lung infection. Pt seems comfortable. Provided non anxious presence and prayer at bedside.  will schedule next visits.

## 2022-01-26 NOTE — PROGRESS NOTES
[facility]    Progress Note    [facility] Patient Status:  Inpatient    1935 MRN [id number]   Location [facility] 5SW/SE Attending Tarsha Pack MD   Hosp Day # 15 PCP Josias Andres MD       SUBJECTIVE:  Perry Urias Nightly  risperiDONE (RISPERDAL) tab 1 mg, 1 mg, Oral, 2 times per day  Vitamin D3 (Cholecalciferol) cap 5,000 Units, 5,000 Units, Oral, Daily  mirtazapine (REMERON) tab 7.5 mg, 7.5 mg, Oral, Nightly  Heparin Sodium (Porcine) 5000 UNIT/ML injection 5,000 U

## 2022-01-26 NOTE — PROGRESS NOTES
[facility]    Progress Note    [facility] Patient Status:  Inpatient    1935 MRN [id number]   Location [facility] 5SW/SE Attending Manny Barker MD   Hosp Day # 15 PCP Rafal Zhong MD       SUBJECTIVE:  Tarri Height Continuous  multivitamin with minerals (Thera M Plus) tab 1 tablet, 1 tablet, Oral, Daily  cyanocobalamine (VITAMIN B12) tab 500 mcg, 500 mcg, Oral, Daily  donepezil (ARICEPT) tab 10 mg, 10 mg, Oral, Nightly  latanoprost (XALATAN) 0.005 % ophthalmic soluti

## 2022-01-27 NOTE — HOSPICE RN NOTE
[facility] at bedside this morning for rounds, patient resting in bed, unresponsive, RR 24/min, labored and irregular breaths on room air.  Patient's daughter Trung Espino called for update, end of life signs and symptoms discussed, emotional support provided

## 2022-01-27 NOTE — COVID NURSING ASSESSMENT
COVID-19 Daily Discharge Readiness-Nursing    O2 Sat at Rest:     %   O2 Sat with Exertion:    % on    liters   Temperature max from last 24 hrs: Temp (24hrs), Av.7 °F (36.5 °C), Min:97.4 °F (36.3 °C), Max:98 °F (36.7 °C)    Inflammatory Markers: No re

## 2022-01-27 NOTE — PAYOR COMM NOTE
--------------  DISCHARGE REVIEW    Joe Hardwick MA [facility]  Subscriber #:  [id number]  Authorization Number: [id number]    Admit date: 1/12/22  Admit time:  12:54 PM  Discharge Date: 1/26/2022  1:29 PM     Admitting Physician: Andres Salazar MD  Attending

## 2022-01-27 NOTE — PLAN OF CARE
Patient on scheduled morphine, resting comfortably. Vitals stable. Comfort care provided. Frequent rounding and monitoring done.

## 2022-01-27 NOTE — HOSPICE RN NOTE
[facility] inpatient nursing rounds. Patient was medicated with IVP lorazepam 0.5mg and morphine 1mg since last visit. No objective signs of distress at this time. Page out to Dr. Kelly Hallman for request to schedule morphine 1mg IVP every 4 hrs.  [employer]

## 2022-01-27 NOTE — HOSPICE RN NOTE
[facility] inpatient nursing rounds. Patient appears restless in the bed. Pulling at lines and sheets. Requested for hospital nurse to administer dose of IVP lorazepam now for comfort. Last VS /54, HR 72, RR 16, Temp 97.8A.  Remains appropria

## 2022-01-28 NOTE — CM/SW NOTE
MSW stopped by patient room. No family members. Called daughter Caden Bautista and left voicemail message with hospice contact information. Case coordination with hospice nurse Lloyd Bob and hospital nurse Morales Luciano.

## 2022-01-28 NOTE — PROGRESS NOTES
[place] [facility] - [facility]    Progress Note    [facility] Patient Status:  Inpatient    1935 MRN [id number]   Location [facility] 5SW/SE Attending Manny Barker MD   Hosp Day # 2 PCP Rafal Zhong MD     Late entry  [address] mg, Intravenous, Q1H PRN  LORazepam (ATIVAN) injection 0.5 mg, 0.5 mg, Intravenous, Q4H PRN   Or  LORazepam (ATIVAN) injection 1 mg, 1 mg, Intravenous, Q4H PRN   Or  LORazepam (ATIVAN) injection 2 mg, 2 mg, Intravenous, Q4H PRN  scopolamine (TRANSDERM-SCOP

## 2022-01-28 NOTE — PLAN OF CARE
Cont'd morphine drip. Atropine, glycopyrrolate and lasix given to reduce secretions. Suctioned as needed. Continue to monitor.

## 2022-01-28 NOTE — HOSPICE RN NOTE
Patient remains unresponsive with labored breathing, recommendation to increase Morphine gtt rate and administer Ativan prn discussed with Deepa Santa. Please contact [facility] with POC questions or if patient expires.     Jericho Jacob RN  [facility]

## 2022-01-28 NOTE — PLAN OF CARE
Increased morphine drip to 2 mg/hr,ativan 1mg iv prn given,pt unresponsive,breathing better,repositioned as needed,kept pt comfortable

## 2022-01-28 NOTE — PROGRESS NOTES
Pt unresponsive,with agonal breathing,continuing morphine drip at 3mg/hr,comfortable measures provided,will continue to monitor

## 2022-01-28 NOTE — HOSPICE RN NOTE
[facility] at bedside this morning for rounds, patient resting in bed, unresponsive, RR 16/min, labored inspirations but controlled rate, mild congestion auscultated throughout lung fields, Morphine gtt infusing at 3mg/hr.  Patient's daughter Baylee Chen serina

## 2022-01-28 NOTE — PROGRESS NOTES
[facility]    Progress Note    [facility] Patient Status:  Inpatient    1935 MRN [id number]   Location [facility] 5SW/SE Attending David Yoon MD   Hosp Day # 2 PCP James Garcia MD       SUBJECTIVE:  Lay Valdovinos lactate (HALDOL) 5 MG/ML injection 2 mg, 2 mg, Intravenous, Q1H PRN  LORazepam (ATIVAN) injection 0.5 mg, 0.5 mg, Intravenous, Q4H PRN   Or  LORazepam (ATIVAN) injection 1 mg, 1 mg, Intravenous, Q4H PRN   Or  LORazepam (ATIVAN) injection 2 mg, 2 mg, Starlett Hose

## 2022-01-29 NOTE — PROGRESS NOTES
I came to do bedside shift report with the day time RN and the patient. Two RNs from [facility] reported that the patient had  at 1. TL Britney is aware and has made calls to the family, doctor and hospice care. [facility] is performing post mortem care.

## 2022-01-29 NOTE — PROGRESS NOTES
Patient's time of death pronounced at 1. [facility] referral number [id number]   Per [facility] Page, \"not a candidate, ok to release body\".  and Daughter Caden Bautista notified. Dr Francisco Navarro Notified.